# Patient Record
Sex: MALE | Race: WHITE | NOT HISPANIC OR LATINO | Employment: STUDENT | URBAN - METROPOLITAN AREA
[De-identification: names, ages, dates, MRNs, and addresses within clinical notes are randomized per-mention and may not be internally consistent; named-entity substitution may affect disease eponyms.]

---

## 2023-09-18 ENCOUNTER — OFFICE VISIT (OUTPATIENT)
Dept: PRIMARY CARE | Facility: CLINIC | Age: 28
End: 2023-09-18
Payer: COMMERCIAL

## 2023-09-18 VITALS
WEIGHT: 206 LBS | DIASTOLIC BLOOD PRESSURE: 70 MMHG | SYSTOLIC BLOOD PRESSURE: 124 MMHG | BODY MASS INDEX: 29.49 KG/M2 | HEIGHT: 70 IN

## 2023-09-18 DIAGNOSIS — R19.7 DIARRHEA, UNSPECIFIED TYPE: ICD-10-CM

## 2023-09-18 DIAGNOSIS — Z00.00 WELL ADULT EXAM: Primary | ICD-10-CM

## 2023-09-18 PROBLEM — F32.A DEPRESSION: Status: ACTIVE | Noted: 2019-10-10

## 2023-09-18 PROBLEM — T50.901A ACCIDENTAL OVERDOSE: Status: ACTIVE | Noted: 2019-08-30

## 2023-09-18 PROCEDURE — 99395 PREV VISIT EST AGE 18-39: CPT | Performed by: FAMILY MEDICINE

## 2023-09-18 ASSESSMENT — ENCOUNTER SYMPTOMS
SLEEP DISTURBANCE: 1
NEUROLOGICAL NEGATIVE: 1
CONSTITUTIONAL NEGATIVE: 1
EYES NEGATIVE: 1
DIARRHEA: 1
RESPIRATORY NEGATIVE: 1
NERVOUS/ANXIOUS: 1
CARDIOVASCULAR NEGATIVE: 1

## 2023-09-18 ASSESSMENT — PATIENT HEALTH QUESTIONNAIRE - PHQ9
2. FEELING DOWN, DEPRESSED OR HOPELESS: SEVERAL DAYS
SUM OF ALL RESPONSES TO PHQ9 QUESTIONS 1 AND 2: 2
10. IF YOU CHECKED OFF ANY PROBLEMS, HOW DIFFICULT HAVE THESE PROBLEMS MADE IT FOR YOU TO DO YOUR WORK, TAKE CARE OF THINGS AT HOME, OR GET ALONG WITH OTHER PEOPLE: SOMEWHAT DIFFICULT
1. LITTLE INTEREST OR PLEASURE IN DOING THINGS: SEVERAL DAYS

## 2023-09-18 NOTE — PATIENT INSTRUCTIONS
Metamucil (sugar free) or benefiber for fiber supplement daily       Endocrine Surgery   Berny Yepez MD   [address] [facility] [place] [address]   [phone] (Work)   [phone] (Fax)   Informed Consent

## 2023-09-18 NOTE — PROGRESS NOTES
"Subjective   Patient ID: Hemanth Velez is a 27 y.o. male who presents for Annual Exam (CPE).    Pt presents for an annual PHE    No recent illnesses  Last dental visit: is due, was late 2022  No glasses or contacts       No changes in family hx the last 6 months     Pos THC use, daily  No tobacco  Caffeine: 200 mg/ day  Alcohol: 2 drinks per day   Exercise: walking, volleyball weekly, golf   Diet: not the healthiest per pt              Review of Systems   Constitutional: Negative.    HENT: Negative.          Pos seasonal allergies, no OTC meds   Eyes: Negative.    Respiratory: Negative.     Cardiovascular: Negative.    Gastrointestinal:  Positive for diarrhea.        Is concerned with diarrhea, occ stomach cramping    Musculoskeletal:         Remote hand fracture   Right jaw clicking, notes it is worse when he sleeps on his side   He has an OTC     Neurological: Negative.    Psychiatric/Behavioral:  Positive for sleep disturbance. Negative for self-injury and suicidal ideas. The patient is nervous/anxious.         Anxiety, assc with THC use  Trouble falling asleep       Objective   /70   Ht 1.765 m (5' 9.5\")   Wt 93.4 kg (206 lb)   BMI 29.98 kg/m²     Physical Exam  Vitals reviewed.   Constitutional:       Appearance: Normal appearance.   HENT:      Right Ear: Tympanic membrane, ear canal and external ear normal.      Left Ear: Tympanic membrane, ear canal and external ear normal.      Mouth/Throat:      Mouth: Mucous membranes are moist.      Pharynx: Oropharynx is clear.   Cardiovascular:      Rate and Rhythm: Normal rate and regular rhythm.      Heart sounds: Normal heart sounds.   Pulmonary:      Effort: Pulmonary effort is normal.      Breath sounds: Normal breath sounds.   Abdominal:      General: Abdomen is flat. Bowel sounds are normal.      Palpations: Abdomen is soft.   Musculoskeletal:         General: Normal range of motion.      Cervical back: Normal range of motion and neck supple. " LVM that we want to make sure he has enough valsartan since [facility] doesn't have them they are on back order and if so we would have to send to a local pharmacy, please obtain local pharmacy details if he is low.     Skin:     General: Skin is warm and dry.   Neurological:      General: No focal deficit present.      Mental Status: He is alert and oriented to person, place, and time.   Psychiatric:         Mood and Affect: Mood normal.         Behavior: Behavior normal.         Thought Content: Thought content normal.         Judgment: Judgment normal.         Assessment/Plan   Diagnoses and all orders for this visit:  Well adult exam  -     Referral to Ophthalmology; Future  -     HIV 1/2 Antigen/Antibody Screen with Reflex to Confirmation; Future  -     Hepatitis C antibody; Future  Diarrhea, unspecified type    Recc the addition of fiber  Increase exercise  Recc healthy diet  Follow up as needed    Gena Elise, DO

## 2023-10-11 ENCOUNTER — APPOINTMENT (OUTPATIENT)
Dept: PRIMARY CARE | Facility: CLINIC | Age: 28
End: 2023-10-11
Payer: COMMERCIAL